# Patient Record
Sex: FEMALE | Race: WHITE | NOT HISPANIC OR LATINO | URBAN - METROPOLITAN AREA
[De-identification: names, ages, dates, MRNs, and addresses within clinical notes are randomized per-mention and may not be internally consistent; named-entity substitution may affect disease eponyms.]

---

## 2020-05-19 ENCOUNTER — APPOINTMENT (RX ONLY)
Dept: URBAN - METROPOLITAN AREA CLINIC 44 | Facility: CLINIC | Age: 25
Setting detail: DERMATOLOGY
End: 2020-05-19

## 2020-05-19 ENCOUNTER — APPOINTMENT (RX ONLY)
Dept: URBAN - METROPOLITAN AREA CLINIC 45 | Facility: CLINIC | Age: 25
Setting detail: DERMATOLOGY
End: 2020-05-19

## 2020-05-19 DIAGNOSIS — L84 CORNS AND CALLOSITIES: ICD-10-CM

## 2020-05-19 PROCEDURE — ? ADDITIONAL NOTES

## 2020-05-19 PROCEDURE — 99202 OFFICE O/P NEW SF 15 MIN: CPT

## 2020-05-19 PROCEDURE — ? COUNSELING

## 2020-05-19 ASSESSMENT — LOCATION SIMPLE DESCRIPTION DERM
LOCATION SIMPLE: PLANTAR SURFACE OF LEFT 1ST TOE
LOCATION SIMPLE: PLANTAR SURFACE OF RIGHT 1ST TOE
LOCATION SIMPLE: PLANTAR SURFACE OF LEFT 1ST TOE
LOCATION SIMPLE: PLANTAR SURFACE OF RIGHT 5TH TOE
LOCATION SIMPLE: PLANTAR SURFACE OF LEFT 5TH TOE
LOCATION SIMPLE: PLANTAR SURFACE OF RIGHT 1ST TOE
LOCATION SIMPLE: PLANTAR SURFACE OF LEFT 5TH TOE
LOCATION SIMPLE: PLANTAR SURFACE OF RIGHT 5TH TOE

## 2020-05-19 ASSESSMENT — LOCATION DETAILED DESCRIPTION DERM
LOCATION DETAILED: LEFT LATERAL PLANTAR 1ST TOE
LOCATION DETAILED: RIGHT LATERAL PLANTAR 5TH TOE
LOCATION DETAILED: LEFT LATERAL PLANTAR 5TH TOE
LOCATION DETAILED: RIGHT LATERAL PLANTAR 1ST TOE
LOCATION DETAILED: LEFT LATERAL PLANTAR 1ST TOE
LOCATION DETAILED: RIGHT LATERAL PLANTAR 5TH TOE
LOCATION DETAILED: LEFT LATERAL PLANTAR 5TH TOE
LOCATION DETAILED: RIGHT LATERAL PLANTAR 1ST TOE

## 2020-05-19 ASSESSMENT — LOCATION ZONE DERM
LOCATION ZONE: TOE
LOCATION ZONE: TOE

## 2020-05-19 NOTE — PROCEDURE: ADDITIONAL NOTES
Additional Notes: Pared patient’s lesions with a [employer] blade by hand. Recommended patient purchase a Dr. Romano’s corn remover kit. If OTC medications and paring isn’t effective, recommended patient see podiatry
Detail Level: Detailed

## 2020-05-19 NOTE — PROCEDURE: ADDITIONAL NOTES
Additional Notes: Pared patient’s lesions with a [employer] blade by hand. Recommended patient purchase a Dr. Varner’s corn remover kit. If OTC medications and paring isn’t effective, recommended patient see podiatry
Detail Level: Detailed